# Patient Record
Sex: MALE | Race: WHITE | NOT HISPANIC OR LATINO | URBAN - METROPOLITAN AREA
[De-identification: names, ages, dates, MRNs, and addresses within clinical notes are randomized per-mention and may not be internally consistent; named-entity substitution may affect disease eponyms.]

---

## 2019-11-02 ENCOUNTER — HOSPITAL ENCOUNTER (EMERGENCY)
Facility: HOSPITAL | Age: 59
Discharge: HOME | End: 2019-11-02
Attending: EMERGENCY MEDICINE

## 2019-11-02 VITALS
HEIGHT: 68 IN | HEART RATE: 81 BPM | OXYGEN SATURATION: 98 % | WEIGHT: 213.88 LBS | TEMPERATURE: 98 F | BODY MASS INDEX: 32.41 KG/M2 | DIASTOLIC BLOOD PRESSURE: 108 MMHG | SYSTOLIC BLOOD PRESSURE: 182 MMHG | RESPIRATION RATE: 17 BRPM
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
## 2019-11-02 DIAGNOSIS — S40.011A CONTUSION OF RIGHT SHOULDER, INITIAL ENCOUNTER: ICD-10-CM

## 2019-11-02 DIAGNOSIS — M25.511 ACUTE PAIN OF RIGHT SHOULDER: Primary | ICD-10-CM

## 2019-11-02 DIAGNOSIS — R03.0 ELEVATED BLOOD PRESSURE READING: ICD-10-CM

## 2019-11-02 PROCEDURE — 99284 EMERGENCY DEPT VISIT MOD MDM: CPT | Mod: 25

## 2019-11-02 RX ORDER — TRAMADOL HYDROCHLORIDE 50 MG/1
50 TABLET ORAL
Qty: 6 TABLET | Refills: 0 | Status: SHIPPED | OUTPATIENT
Start: 2019-11-02 | End: 2019-11-12

## 2019-11-02 RX ORDER — DICLOFENAC SODIUM 50 MG/1
50 TABLET, DELAYED RELEASE ORAL 3 TIMES DAILY PRN
Qty: 15 TABLET | Refills: 0 | Status: SHIPPED | OUTPATIENT
Start: 2019-11-02

## 2019-11-03 NOTE — ED PROVIDER NOTES
SCRIBE #1 NOTE: I, Tanya Chase, am scribing for, and in the presence of, Elton Frey NP . I have scribed the entire note.      History      Chief Complaint   Patient presents with    Shoulder Pain     fell onto right shoulder last Monday, c/o constant pain       Review of patient's allergies indicates:  No Known Allergies     HPI   HPI    11/2/2019, 7:26 PM   History obtained from the patient      History of Present Illness: Yariel Toussaint is a 59 y.o. male patient who presents to the Emergency Department for R shoulder pain, onset 5 days PTA. Pt reports falling on to his R shoulder 5 days ago. Pt reports he no longer takes his blood pressure medication. Symptoms are constant and moderate in severity. No mitigating or exacerbating factors reported. No associated sxs reported. Patient denies any fever, chills, nausea, weakness, numbness, SOB, and all other sxs at this time. Prior Tx includes Tylenol with no relief. No further complaints or concerns at this time.         Arrival mode: Personal vehicle     PCP: Primary Doctor No       Past Medical History:  Past Medical History:   Diagnosis Date    Hypertension        Past Surgical History:  Past Surgical History:   Procedure Laterality Date    SINUS SURGERY           Family History:  History reviewed. No pertinent family history.     Social History:  Social History     Tobacco Use    Smoking status: Current Every Day Smoker     Types: Cigars   Substance and Sexual Activity    Alcohol use: Yes    Drug use: Not Currently    Sexual activity: unknown       ROS   Review of Systems   Constitutional: Negative for chills and fever.   HENT: Negative for sore throat.    Respiratory: Negative for cough and shortness of breath.    Cardiovascular: Negative for chest pain.   Gastrointestinal: Negative for abdominal pain, constipation, diarrhea, nausea and vomiting.   Genitourinary: Negative for dysuria.   Musculoskeletal: Negative for back pain and neck pain.        (+)  "R shoulder pain   Skin: Negative for rash.   Neurological: Negative for dizziness, weakness, numbness and headaches.   Hematological: Does not bruise/bleed easily.   All other systems reviewed and are negative.      Physical Exam      Initial Vitals [11/02/19 1919]   BP Pulse Resp Temp SpO2   (!) 182/108 81 17 98.2 °F (36.8 °C) 98 %      MAP       --          Physical Exam  Nursing Notes and Vital Signs Reviewed.  Constitutional: Patient is in mild distress. Well-developed and well-nourished.  Head: Atraumatic. Normocephalic.  Eyes: PERRL. EOM intact. Conjunctivae are not pale. No scleral icterus.  ENT: Mucous membranes are moist. Oropharynx is clear and symmetric.    Neck: Supple. Full ROM. No lymphadenopathy.  Cardiovascular: Regular rate. Regular rhythm. No murmurs, rubs, or gallops. Distal pulses are 2+ and symmetric.  Pulmonary/Chest: No respiratory distress. Clear to auscultation bilaterally. No wheezing or rales.  Abdominal: Soft and non-distended.  There is no tenderness.  No rebound, guarding, or rigidity. Good bowel sounds.  Genitourinary: No CVA tenderness  Musculoskeletal: Moves all extremities. No obvious deformities. No edema. No calf tenderness.  RUE: has no evident deformity. ROM is normal. Cap refill distally is <2 seconds. Radial pulses are equal and 2+ bilaterally. No motor deficit. No distal sensory deficit. NVI distally.   Skin: Warm and dry.  Neurological:  Alert, awake, and appropriate.  Normal speech.  No acute focal neurological deficits are appreciated.  Psychiatric: Normal affect. Good eye contact. Appropriate in content.    ED Course    Procedures  ED Vital Signs:  Vitals:    11/02/19 1919   BP: (!) 182/108   Pulse: 81   Resp: 17   Temp: 98.2 °F (36.8 °C)   TempSrc: Oral   SpO2: 98%   Weight: 97 kg (213 lb 13.5 oz)   Height: 5' 8" (1.727 m)       Abnormal Lab Results:  Labs Reviewed - No data to display     All Lab Results:  None    Imaging Results:  Imaging Results          X-Ray " Shoulder Trauma Right (Final result)  Result time 11/02/19 20:05:36    Final result by Rudi Rivera MD (11/02/19 20:05:36)                 Impression:      Normal right shoulder x-ray.      Electronically signed by: Rudi Rivera MD  Date:    11/02/2019  Time:    20:05             Narrative:    EXAMINATION:  XR SHOULDER TRAUMA 3 VIEW RIGHT    CLINICAL HISTORY:  Unspecified fall, initial encounter    TECHNIQUE:  Three or four views of the right shoulder were performed.    COMPARISON:  None    FINDINGS:  No fracture or dislocation or degenerative change of the right shoulder.                                        The Emergency Provider reviewed the vital signs and test results, which are outlined above.    ED Discussion     8:15 PM: Reassessed pt at this time. Discussed with pt all pertinent ED information and results. Discussed pt dx and plan of tx. Gave pt all f/u and return to the ED instructions. All questions and concerns were addressed at this time. Pt expresses understanding of information and instructions, and is comfortable with plan to discharge. Pt is stable for discharge.    I discussed with patient and/or family/caretaker that evaluation in the ED does not suggest any emergent or life threatening medical conditions requiring immediate intervention beyond what was provided in the ED, and I believe patient is safe for discharge.  Regardless, an unremarkable evaluation in the ED does not preclude the development or presence of a serious of life threatening condition. As such, patient was instructed to return immediately for any worsening or change in current symptoms.      ED Medication(s):  Medications - No data to display    Follow-up Information     [facility]    Specialty:  Emergency Medicine  Why:  If symptoms worsen, As needed  Contact information:  [address]  [facility] [address] [phone]           Schedule an appointment as soon as possible for a  visit  with PCP.                 Discharge Medication List as of 11/2/2019  8:16 PM      START taking these medications    Details   diclofenac (VOLTAREN) 50 MG EC tablet Take 1 tablet (50 mg total) by mouth 3 (three) times daily as needed., Starting Sat 11/2/2019, Print      traMADol (ULTRAM) 50 mg tablet Take 1 tablet (50 mg total) by mouth every 24 hours as needed for Pain., Starting Sat 11/2/2019, Until Tue 11/12/2019, Print             Medical Decision Making    Medical Decision Making:   Clinical Tests:   Radiological Study: Ordered and Reviewed           Scribe Attestation:   Scribe #1: I performed the above scribed service and the documentation accurately describes the services I performed. I attest to the accuracy of the note.    Attending:   Physician Attestation Statement for Scribe #1: I, Elton Frey NP, personally performed the services described in this documentation, as scribed by Tanya Chase, in my presence, and it is both accurate and complete.          Clinical Impression       ICD-10-CM ICD-9-CM   1. Acute pain of right shoulder M25.511 719.41   2. Fall W19.XXXA E888.9   3. Contusion of right shoulder, initial encounter S40.011A 923.00   4. Elevated blood pressure reading R03.0 796.2       Disposition:   Disposition: Discharged  Condition: Stable         Elton Frey NP  11/03/19 1631